# Patient Record
Sex: FEMALE | Race: BLACK OR AFRICAN AMERICAN
[De-identification: names, ages, dates, MRNs, and addresses within clinical notes are randomized per-mention and may not be internally consistent; named-entity substitution may affect disease eponyms.]

---

## 2019-06-24 ENCOUNTER — HOSPITAL ENCOUNTER (EMERGENCY)
Dept: HOSPITAL 8 - ED | Age: 39
Discharge: HOME | End: 2019-06-24
Payer: SELF-PAY

## 2019-06-24 VITALS — SYSTOLIC BLOOD PRESSURE: 114 MMHG | DIASTOLIC BLOOD PRESSURE: 78 MMHG

## 2019-06-24 VITALS — WEIGHT: 202.6 LBS | HEIGHT: 62 IN | BODY MASS INDEX: 37.28 KG/M2
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
## 2019-06-24 DIAGNOSIS — S33.5XXA: Primary | ICD-10-CM

## 2019-06-24 DIAGNOSIS — Z87.891: ICD-10-CM
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
## 2019-06-24 DIAGNOSIS — Z88.0: ICD-10-CM

## 2019-06-24 PROCEDURE — 99283 EMERGENCY DEPT VISIT LOW MDM: CPT
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
